# Patient Record
Sex: FEMALE | Race: OTHER | HISPANIC OR LATINO
[De-identification: names, ages, dates, MRNs, and addresses within clinical notes are randomized per-mention and may not be internally consistent; named-entity substitution may affect disease eponyms.]

---

## 2017-06-15 ENCOUNTER — APPOINTMENT (OUTPATIENT)
Dept: SURGERY | Facility: CLINIC | Age: 57
End: 2017-06-15

## 2017-06-15 VITALS
BODY MASS INDEX: 28.89 KG/M2 | DIASTOLIC BLOOD PRESSURE: 70 MMHG | WEIGHT: 153 LBS | HEART RATE: 63 BPM | HEIGHT: 61 IN | OXYGEN SATURATION: 96 % | SYSTOLIC BLOOD PRESSURE: 110 MMHG

## 2017-06-28 ENCOUNTER — OUTPATIENT (OUTPATIENT)
Dept: OUTPATIENT SERVICES | Facility: HOSPITAL | Age: 57
LOS: 1 days | End: 2017-06-28
Payer: COMMERCIAL

## 2017-06-28 VITALS
HEIGHT: 61 IN | HEART RATE: 55 BPM | RESPIRATION RATE: 16 BRPM | OXYGEN SATURATION: 97 % | DIASTOLIC BLOOD PRESSURE: 83 MMHG | WEIGHT: 154.98 LBS | SYSTOLIC BLOOD PRESSURE: 143 MMHG | TEMPERATURE: 99 F

## 2017-06-28 DIAGNOSIS — Z98.41 CATARACT EXTRACTION STATUS, RIGHT EYE: Chronic | ICD-10-CM

## 2017-06-28 DIAGNOSIS — M67.432 GANGLION, LEFT WRIST: ICD-10-CM

## 2017-06-28 DIAGNOSIS — Z90.710 ACQUIRED ABSENCE OF BOTH CERVIX AND UTERUS: Chronic | ICD-10-CM

## 2017-06-28 DIAGNOSIS — Z01.818 ENCOUNTER FOR OTHER PREPROCEDURAL EXAMINATION: ICD-10-CM

## 2017-06-28 PROCEDURE — G0463: CPT

## 2017-06-28 RX ORDER — SODIUM CHLORIDE 9 MG/ML
3 INJECTION INTRAMUSCULAR; INTRAVENOUS; SUBCUTANEOUS EVERY 8 HOURS
Qty: 0 | Refills: 0 | Status: DISCONTINUED | OUTPATIENT
Start: 2017-07-07 | End: 2017-07-15

## 2017-06-28 NOTE — H&P PST ADULT - NSANTHOSAYNRD_GEN_A_CORE
No. MARIA M screening performed.  STOP BANG Legend: 0-2 = LOW Risk; 3-4 = INTERMEDIATE Risk; 5-8 = HIGH Risk

## 2017-06-28 NOTE — H&P PST ADULT - HISTORY OF PRESENT ILLNESS
57year old female with significant pmhx presents today for presurgical testing for scheduled excision of left wrist ganglion cyst on 7/7/17

## 2017-06-28 NOTE — H&P PST ADULT - MUSCULOSKELETAL
details… detailed exam decreased ROM due to pain/joint swelling/left wrist dorsal area with palpable, tender to touch mass

## 2017-06-28 NOTE — H&P PST ADULT - PMH
Cataract  right eye  Chronic lower back pain    Essential hypertension    Hypercholesterolemia    Hypoplasia of kidney  right kidney hypoplasia, left normal size and function  Osteoarthritis  bilateral knees  Prediabetes    Rhinitis, allergic    Vitamin D deficiency

## 2017-06-28 NOTE — H&P PST ADULT - FAMILY HISTORY
Mother  Still living? No  Family history of liver cancer, Age at diagnosis: Age Unknown  Family history of uterine cancer, Age at diagnosis: Age Unknown

## 2017-07-07 ENCOUNTER — RESULT REVIEW (OUTPATIENT)
Age: 57
End: 2017-07-07

## 2017-07-07 ENCOUNTER — OUTPATIENT (OUTPATIENT)
Dept: OUTPATIENT SERVICES | Facility: HOSPITAL | Age: 57
LOS: 1 days | Discharge: HOME | End: 2017-07-07
Payer: COMMERCIAL

## 2017-07-07 VITALS
WEIGHT: 154.98 LBS | RESPIRATION RATE: 14 BRPM | OXYGEN SATURATION: 97 % | HEART RATE: 59 BPM | TEMPERATURE: 98 F | SYSTOLIC BLOOD PRESSURE: 116 MMHG | DIASTOLIC BLOOD PRESSURE: 66 MMHG | HEIGHT: 61 IN

## 2017-07-07 VITALS
DIASTOLIC BLOOD PRESSURE: 74 MMHG | HEART RATE: 70 BPM | SYSTOLIC BLOOD PRESSURE: 121 MMHG | RESPIRATION RATE: 17 BRPM | OXYGEN SATURATION: 100 % | TEMPERATURE: 98 F

## 2017-07-07 DIAGNOSIS — Z98.41 CATARACT EXTRACTION STATUS, RIGHT EYE: Chronic | ICD-10-CM

## 2017-07-07 DIAGNOSIS — M67.432 GANGLION, LEFT WRIST: ICD-10-CM

## 2017-07-07 DIAGNOSIS — Z90.710 ACQUIRED ABSENCE OF BOTH CERVIX AND UTERUS: Chronic | ICD-10-CM

## 2017-07-07 PROCEDURE — 88304 TISSUE EXAM BY PATHOLOGIST: CPT | Mod: 26

## 2017-07-07 PROCEDURE — 25111 REMOVE WRIST TENDON LESION: CPT | Mod: LT

## 2017-07-07 PROCEDURE — 88304 TISSUE EXAM BY PATHOLOGIST: CPT

## 2017-07-07 RX ORDER — PANTOPRAZOLE SODIUM 20 MG/1
1 TABLET, DELAYED RELEASE ORAL
Qty: 0 | Refills: 0 | COMMUNITY

## 2017-07-07 RX ORDER — ACETAMINOPHEN WITH CODEINE 300MG-30MG
1 TABLET ORAL
Qty: 6 | Refills: 0 | OUTPATIENT
Start: 2017-07-07

## 2017-07-07 RX ORDER — LISINOPRIL 2.5 MG/1
1 TABLET ORAL
Qty: 0 | Refills: 0 | COMMUNITY

## 2017-07-07 RX ORDER — ONDANSETRON 8 MG/1
4 TABLET, FILM COATED ORAL ONCE
Qty: 0 | Refills: 0 | Status: DISCONTINUED | OUTPATIENT
Start: 2017-07-07 | End: 2017-07-11

## 2017-07-07 RX ORDER — ACETAMINOPHEN WITH CODEINE 300MG-30MG
1 TABLET ORAL EVERY 4 HOURS
Qty: 0 | Refills: 0 | Status: DISCONTINUED | OUTPATIENT
Start: 2017-07-07 | End: 2017-07-07

## 2017-07-07 RX ORDER — SIMVASTATIN 20 MG/1
1 TABLET, FILM COATED ORAL
Qty: 0 | Refills: 0 | COMMUNITY

## 2017-07-07 RX ORDER — HYDROMORPHONE HYDROCHLORIDE 2 MG/ML
0.5 INJECTION INTRAMUSCULAR; INTRAVENOUS; SUBCUTANEOUS
Qty: 0 | Refills: 0 | Status: DISCONTINUED | OUTPATIENT
Start: 2017-07-07 | End: 2017-07-11

## 2017-07-07 RX ORDER — AMLODIPINE BESYLATE 2.5 MG/1
1 TABLET ORAL
Qty: 0 | Refills: 0 | COMMUNITY

## 2017-07-07 RX ADMIN — SODIUM CHLORIDE 3 MILLILITER(S): 9 INJECTION INTRAMUSCULAR; INTRAVENOUS; SUBCUTANEOUS at 13:29

## 2017-07-07 RX ADMIN — SODIUM CHLORIDE 3 MILLILITER(S): 9 INJECTION INTRAMUSCULAR; INTRAVENOUS; SUBCUTANEOUS at 20:00

## 2017-07-07 NOTE — ASU DISCHARGE PLAN (ADULT/PEDIATRIC). - NOTIFY
Fever greater than 101/Numbness, color, or temperature change to extremity/Bleeding that does not stop/Swelling that continues

## 2017-07-07 NOTE — ASU DISCHARGE PLAN (ADULT/PEDIATRIC). - MEDICATION SUMMARY - MEDICATIONS TO TAKE
I will START or STAY ON the medications listed below when I get home from the hospital:    acetaminophen-codeine 300 mg-30 mg oral tablet  -- 1 tab(s) by mouth every 4 hours, As needed, Moderate Pain (4 - 6) MDD:4  -- Indication: For if pain    lisinopril 40 mg oral tablet  -- 1 tab(s) by mouth once a day  -- Indication: For .    simvastatin 40 mg oral tablet  -- 1 tab(s) by mouth once a day (at bedtime)  -- Indication: For .    amLODIPine 5 mg oral tablet  -- 1 tab(s) by mouth once a day  -- Indication: For .    pantoprazole 40 mg oral delayed release tablet  -- 1 tab(s) by mouth once a day  -- Indication: For .    calcium-vitamin D 600 mg-500 intl units oral tablet, extended release  -- 2 tab(s) by mouth once a day (in the morning)  -- Indication: For .

## 2017-07-10 ENCOUNTER — TRANSCRIPTION ENCOUNTER (OUTPATIENT)
Age: 57
End: 2017-07-10

## 2017-07-11 LAB — SURGICAL PATHOLOGY FINAL REPORT - CH: SIGNIFICANT CHANGE UP

## 2017-07-14 DIAGNOSIS — E78.00 PURE HYPERCHOLESTEROLEMIA, UNSPECIFIED: ICD-10-CM

## 2017-07-14 DIAGNOSIS — Q60.3 RENAL HYPOPLASIA, UNILATERAL: ICD-10-CM

## 2017-07-14 DIAGNOSIS — I10 ESSENTIAL (PRIMARY) HYPERTENSION: ICD-10-CM

## 2017-07-14 DIAGNOSIS — M54.5 LOW BACK PAIN: ICD-10-CM

## 2017-07-14 DIAGNOSIS — M17.0 BILATERAL PRIMARY OSTEOARTHRITIS OF KNEE: ICD-10-CM

## 2017-07-14 DIAGNOSIS — R73.03 PREDIABETES: ICD-10-CM

## 2017-07-14 DIAGNOSIS — M67.432 GANGLION, LEFT WRIST: ICD-10-CM

## 2017-07-14 DIAGNOSIS — E55.9 VITAMIN D DEFICIENCY, UNSPECIFIED: ICD-10-CM

## 2017-07-26 PROBLEM — H26.9 CATARACT, BILATERAL: Status: RESOLVED | Noted: 2017-07-26 | Resolved: 2017-07-26

## 2017-07-26 PROBLEM — M19.90 OSTEOARTHRITIS: Status: RESOLVED | Noted: 2017-07-26 | Resolved: 2017-07-26

## 2017-07-27 ENCOUNTER — APPOINTMENT (OUTPATIENT)
Dept: SURGERY | Facility: CLINIC | Age: 57
End: 2017-07-27

## 2017-07-27 VITALS
OXYGEN SATURATION: 99 % | WEIGHT: 147 LBS | HEIGHT: 61 IN | TEMPERATURE: 98 F | DIASTOLIC BLOOD PRESSURE: 76 MMHG | SYSTOLIC BLOOD PRESSURE: 123 MMHG | HEART RATE: 63 BPM | BODY MASS INDEX: 27.75 KG/M2

## 2017-07-27 DIAGNOSIS — M19.90 UNSPECIFIED OSTEOARTHRITIS, UNSPECIFIED SITE: ICD-10-CM

## 2017-07-27 DIAGNOSIS — H26.9 UNSPECIFIED CATARACT: ICD-10-CM

## 2017-08-01 NOTE — H&P PST ADULT - NSWEIGHTCALCTOOLDRUG_GEN_A_CORE
Quality 130: Documentation Of Current Medications In The Medical Record: Current Medications Documented Detail Level: Detailed  used

## 2018-06-11 NOTE — H&P PST ADULT - IS PATIENT PREGNANT?
[address] [address] 
[phone] Patient: Shola Baumann MRN: [id number] Visit Information Date & Time Provider Department Dept. Phone Encounter #  
 6/11/2018  7:15 AM Alejandra Denny NP [address] [facility] [phone] [id number] Follow-up Instructions Return if symptoms worsen or fail to improve. Upcoming Health Maintenance Date Due Pneumococcal 19-64 Medium Risk (1 of 1 - PPSV23) 6/30/1977 DTaP/Tdap/Td series (1 - Tdap) 6/30/1979 EYE EXAM RETINAL OR DILATED Q1 8/18/2016 FOOT EXAM Q1 11/28/2017 COLONOSCOPY 1/1/2018 MEDICARE YEARLY EXAM 3/28/2018 ZOSTER VACCINE AGE 60> 4/30/2018 Influenza Age 5 to Adult 8/1/2018 HEMOGLOBIN A1C Q6M 9/8/2018 MICROALBUMIN Q1 9/8/2018 BREAST CANCER SCRN MAMMOGRAM 12/29/2018 LIPID PANEL Q1 3/8/2019 Allergies as of 6/11/2018  Review Complete On: 6/11/2018 By: Hernandez Sullivan LPN No Known Allergies Current Immunizations  Reviewed on 11/23/2015 Name Date Influenza Vaccine 9/12/2017, 10/29/2013 Influenza Vaccine Braydon Bitter) 12/5/2014 Influenza Vaccine (Quad) PF 11/28/2016, 11/23/2015 Influenza Vaccine Whole 10/1/2011 Pneumococcal Conjugate (PCV-13) 9/12/2017 Not reviewed this visit You Were Diagnosed With   
  
 Codes Comments Insect bite of right lower extremity, initial encounter    -  Primary ICD-10-CM: R26.276K, Q74. Tristian Yaneli ICD-9-CM: 916.4, E906.4 Fatigue, unspecified type     ICD-10-CM: R53.83 ICD-9-CM: 780.79 Hypersomnia     ICD-10-CM: G47.10 ICD-9-CM: 780.54 Vitals BP Pulse Temp Resp Height(growth percentile) Weight(growth percentile) 119/82 82 98.7 °F (37.1 °C) (Oral) 16 5' 4\" (1.626 m) 172 lb (78 kg) LMP SpO2 BMI OB Status Smoking Status (LMP Unknown) 97% 29.52 kg/m2 Hysterectomy Never Smoker BMI and BSA Data Body Mass Index Body Surface Area 29.52 kg/m 2 1.88 m 2 Preferred Pharmacy Pharmacy Name Phone [facility] [address] [place] [phone] [facility] [address] [phone] Your Updated Medication List  
  
   
This list is accurate as of 6/11/18  7:43 AM.  Always use your most recent med list.  
  
  
  
  
 ACCU-CHEK LUCY PLUS TEST STRP strip Generic drug:  glucose blood VI test strips TEST TWO TIMES DAILY  
  
 BD Single Use Swabs Regular Padm Generic drug:  alcohol swabs USE WITH BLOOD SUGAR CHECKS  
  
 canagliflozin 100 mg tablet Commonly known as:  Elinore Calender Take 2 Tabs by mouth Daily (before breakfast). cholecalciferol (vitamin D3) 2,000 unit Tab Take  by mouth. DULoxetine 30 mg capsule Commonly known as:  CYMBALTA TAKE 1 CAPSULE EVERY DAY  
  
 gabapentin 300 mg capsule Commonly known as:  NEURONTIN  
TAKE 1 CAPSULE THREE TIMES DAILY  
  
 hydroCHLOROthiazide 12.5 mg tablet Commonly known as:  HYDRODIURIL  
TAKE 1 TABLET EVERY DAY Insulin Needles (Disposable) 31 gauge x 5/16\" Ndle Once a day injection with Levemir LEVEMIR FLEXTOUCH U-100 INSULN 100 unit/mL (3 mL) Inpn Generic drug:  insulin detemir U-100 INJECT  40 UNITS SUBCUTANEOUSLY AT BEDTIME  
  
 lidocaine 2 % jelly Commonly known as:  XYLOCAINE  
APPLY A THIN FILM TO AFFECTED AREA ONLY(LOW BACK AND NECK ONLY) ONE TIME DAILY AS NEEDED LORazepam 0.5 mg tablet Commonly known as:  ATIVAN  
TAKE 1 TABLET BY MOUTH TWICE DAILY AS NEEDED FOR ANXIETY  
  
 meloxicam 15 mg tablet Commonly known as:  MOBIC  
TAKE 1 TABLET EVERY DAY  
  
 metFORMIN  mg tablet Commonly known as:  GLUCOPHAGE XR  
TAKE 2 TABLETS DAILY WITH BREAKFAST AND DINNER  
  
 mometasone 0.1 % ointment Commonly known as:  [place] Apply  to affected area two (2) times daily as needed for Skin Irritation. montelukast 10 mg tablet Commonly known as:  SINGULAIR  
TAKE 1 TABLET EVERY DAY  
  
 mupirocin 2 % ointment Commonly known as:  [employer] Apply  to affected area two (2) times a day. As needed for skin infection  
  
 naloxone 2 mg/actuation Spry Use 1 spray intranasally into 1 nostril. Use a new Narcan nasal spray for subsequent doses and administer into alternating nostrils. May repeat every 2 to 3 minutes as needed. rosuvastatin 5 mg tablet Commonly known as:  CRESTOR  
TAKE 1 TABLET EVERY NIGHT  
  
 SUMAtriptan 100 mg tablet Commonly known as:  IMITREX Take 1 Tab by mouth once as needed for Migraine for up to 1 dose. topiramate 25 mg tablet Commonly known as:  TOPAMAX TAKE 2 TABLETS BY MOUTH EVERY NIGHT AT BEDTIME  
  
 traMADol 50 mg tablet Commonly known as:  ULTRAM  
TAKE 1 TABLET BY MOUTH TWICE DAILY AS NEEDED FOR PAIN - must last 30 days  
  
 undecylenic acid-chloroxylenol 25-3 % Soln  
by Apply Externally route. Prescriptions Sent to Pharmacy Refills  
 mometasone (ELOCON) 0.1 % ointment 2 Sig: Apply  to affected area two (2) times daily as needed for Skin Irritation. Class: Normal  
 Pharmacy: [address] [place] [address], [address] [place] [address] #: [phone] Route: Topical  
  
We Performed the Following CBC WITH AUTOMATED DIFF [59448 CPT(R)] TSH 3RD GENERATION [81564 CPT(R)] Follow-up Instructions Return if symptoms worsen or fail to improve. [facility]! Dear Raul Tafoya: 
Thank you for requesting a You[employer] account. Our records indicate that you already have an active You[employer]id[employer] account. You can access your account anytime at [email or web address] Did you know that you can access your hospital and ER discharge instructions at any time in Youxiduo? You can also review all of your test results from your hospital stay or ER visit. Additional Information If you have questions, please visit the Frequently Asked Questions section of the Other Machine website at [email or web address] Remember, Other Machine is NOT to be used for urgent needs. For medical emergencies, dial 911. Now available from your iPhone and Android! Please provide this summary of care documentation to your next provider. Your primary care clinician is listed as Jacklyn Carlisle. If you have any questions after today's visit, please call [phone]. no
